# Patient Record
Sex: FEMALE | Race: WHITE | NOT HISPANIC OR LATINO
[De-identification: names, ages, dates, MRNs, and addresses within clinical notes are randomized per-mention and may not be internally consistent; named-entity substitution may affect disease eponyms.]

---

## 2017-11-21 ENCOUNTER — HOSPITAL (OUTPATIENT)
Dept: OTHER | Age: 42
End: 2017-11-21
Attending: OBSTETRICS & GYNECOLOGY

## 2017-12-23 ENCOUNTER — HOSPITAL (OUTPATIENT)
Dept: OTHER | Age: 42
End: 2017-12-23
Attending: OBSTETRICS & GYNECOLOGY

## 2018-09-06 PROCEDURE — 87624 HPV HI-RISK TYP POOLED RSLT: CPT | Performed by: OBSTETRICS & GYNECOLOGY

## 2018-09-06 PROCEDURE — 88175 CYTOPATH C/V AUTO FLUID REDO: CPT | Performed by: OBSTETRICS & GYNECOLOGY

## 2018-09-27 PROBLEM — E03.8 OTHER SPECIFIED HYPOTHYROIDISM: Status: ACTIVE | Noted: 2018-09-27

## 2018-09-27 PROCEDURE — 81003 URINALYSIS AUTO W/O SCOPE: CPT | Performed by: FAMILY MEDICINE

## 2020-06-17 PROBLEM — F41.9 ANXIETY: Status: ACTIVE | Noted: 2020-06-17

## 2023-07-21 ENCOUNTER — GENETICS ENCOUNTER (OUTPATIENT)
Dept: GENETICS | Facility: HOSPITAL | Age: 48
End: 2023-07-21
Attending: GENETIC COUNSELOR, MS
Payer: COMMERCIAL

## 2023-07-21 ENCOUNTER — NURSE ONLY (OUTPATIENT)
Dept: HEMATOLOGY/ONCOLOGY | Facility: HOSPITAL | Age: 48
End: 2023-07-21
Attending: GENETIC COUNSELOR, MS
Payer: COMMERCIAL

## 2023-07-21 DIAGNOSIS — Z80.9 FAMILY HISTORY OF CANCER: ICD-10-CM

## 2023-07-21 DIAGNOSIS — Z80.3 FAMILY HISTORY OF BREAST CANCER: ICD-10-CM

## 2023-07-21 DIAGNOSIS — Z17.1 MALIGNANT NEOPLASM OF RIGHT BREAST IN FEMALE, ESTROGEN RECEPTOR NEGATIVE, UNSPECIFIED SITE OF BREAST: Primary | ICD-10-CM

## 2023-07-21 DIAGNOSIS — C50.911 MALIGNANT NEOPLASM OF RIGHT BREAST IN FEMALE, ESTROGEN RECEPTOR NEGATIVE, UNSPECIFIED SITE OF BREAST: Primary | ICD-10-CM

## 2023-07-21 PROCEDURE — 96040 HC GENETIC COUNSELING EA 30 MIN: CPT | Performed by: GENETIC COUNSELOR, MS

## 2023-07-21 PROCEDURE — 36415 COLL VENOUS BLD VENIPUNCTURE: CPT

## 2023-07-31 ENCOUNTER — GENETICS ENCOUNTER (OUTPATIENT)
Dept: HEMATOLOGY/ONCOLOGY | Facility: HOSPITAL | Age: 48
End: 2023-07-31

## 2023-07-31 DIAGNOSIS — Z13.71 BRCA GENE MUTATION NEGATIVE IN FEMALE: Primary | ICD-10-CM

## 2023-07-31 NOTE — PROGRESS NOTES
Patient Name: Chyna Jama  YOB: 1975    Referring Provider:  Ursula Zafar MD; Keyana Bunn DO      Reason for Referral:  Ms. Leti Alexis had genetic testing performed on 7/21/2023 because of a diagnosis of breast cancer at age 52y and a family history of breast cancer and other cancers. Genetic Testing Result:  Negative. No pathogenic variant was found in the following 9 genes on the [facility] breast cancer STAT panel: DON, BRCA1, BRCA2, CDH1, CHEK2, PALB2, PTEN, STK11, TP53. Please refer to the report from [facility] for additional testing information. These results were discussed with Ms. Mayen via telephone on 7/31/2023. Note: results from additional reflex genetic testing is still pending (reflex to common hereditary cancers + RNA panel, 47 genes total) and will be reported as an addendum. Summary and Plan:  These results indicate it is unlikely that Ms. Leti Alexis has a pathogenic variant (harmful genetic mutation) in any of the genes listed above. No pathogenic variants associated with hereditary cancer syndromes were identified. The etiology Ms. Mayen's personal and family history of cancer remains genetically unexplained. Medical management and surveillance for Ms. Mayen and other family members should be based on their personal and family history. All medical management decisions should be made with a physician. The limitations of the testing were discussed with Ms. Mayen including the chance that a pathogenic variant in a gene other than those included in this analysis might be the cause of cancer in Ms. Mayen or in relatives. I encouraged Ms. Mayen to share the genetic test results with her children and other relatives so that they may discuss the implications of this information with their health providers.  Ms. Leti Alexis is also encouraged to contact me on an annual basis to learn if there have been any updates in genetic testing that would apply or if there are changes in the personal and/or family history. Please do not hesitate to contact my office if you have any questions or concerns, [phone].      Abdirashid Humphreys MS, CGC

## 2023-08-07 PROBLEM — Z13.71 BRCA GENE MUTATION NEGATIVE IN FEMALE: Status: ACTIVE | Noted: 2023-08-01

## 2023-08-15 ENCOUNTER — TELEPHONE (OUTPATIENT)
Dept: MAMMOGRAPHY | Facility: HOSPITAL | Age: 48
End: 2023-08-15

## 2023-08-15 NOTE — TELEPHONE ENCOUNTER
Attempted to call patient re: sentinel lymph node mapping and wire localization procedure education. Message left for patient to call back.